# Patient Record
Sex: MALE | Race: WHITE | Employment: OTHER | URBAN - METROPOLITAN AREA
[De-identification: names, ages, dates, MRNs, and addresses within clinical notes are randomized per-mention and may not be internally consistent; named-entity substitution may affect disease eponyms.]

---

## 2017-05-10 ENCOUNTER — OFFICE VISIT (OUTPATIENT)
Dept: SURGERY | Facility: CLINIC | Age: 77
End: 2017-05-10

## 2017-05-10 VITALS
RESPIRATION RATE: 16 BRPM | BODY MASS INDEX: 34.02 KG/M2 | SYSTOLIC BLOOD PRESSURE: 118 MMHG | TEMPERATURE: 98 F | HEIGHT: 69.5 IN | HEART RATE: 61 BPM | DIASTOLIC BLOOD PRESSURE: 80 MMHG | WEIGHT: 235 LBS

## 2017-05-10 DIAGNOSIS — C61 PROSTATE CANCER (HCC): Primary | ICD-10-CM

## 2017-05-10 DIAGNOSIS — R36.1 HEMOSPERMIA: ICD-10-CM

## 2017-05-10 DIAGNOSIS — N52.01 ERECTILE DYSFUNCTION DUE TO ARTERIAL INSUFFICIENCY: ICD-10-CM

## 2017-05-10 DIAGNOSIS — R35.1 NOCTURIA: ICD-10-CM

## 2017-05-10 DIAGNOSIS — R31.0 GROSS HEMATURIA: ICD-10-CM

## 2017-05-10 DIAGNOSIS — K40.20 NON-RECURRENT BILATERAL INGUINAL HERNIA WITHOUT OBSTRUCTION OR GANGRENE: ICD-10-CM

## 2017-05-10 PROCEDURE — 99214 OFFICE O/P EST MOD 30 MIN: CPT | Performed by: UROLOGY

## 2017-05-10 PROCEDURE — G0463 HOSPITAL OUTPT CLINIC VISIT: HCPCS | Performed by: UROLOGY

## 2017-05-10 NOTE — PATIENT INSTRUCTIONS
1.  Please consider seeking opinion from general surgeon concerning your inguinal/groin hernias    2. Please get blood draw for PSA and urine for cytology before visit.

## 2017-05-10 NOTE — PROGRESS NOTES
HPI:    Patient ID: Stan Angulo is a 78year old male. HPI     1. Nocturia  Patient's AUA score today was 6, mild voiding dysfunction category better than AUA 10 on chart review (10/19/2016).   The patient has urinary frequency less than every 2 hour present when we first met him in 1999; initially responded to Viagra, but by the time we did the seed implant he had already stopped responding to Viagra before the seed implant.  Maximum PSA after seed implant was 2.6, occurring 6 months after implant; rem fever and unexpected weight change. Respiratory: Negative for shortness of breath. Cardiovascular: Negative for chest pain. Gastrointestinal: Negative for constipation. Neurological: Negative for speech difficulty.         HISTORY:  Past Medical Hi mouth daily. Disp:  Rfl:    allopurinol 300 MG Oral Tab TAKE 1 TABLET ONCE DAILY Disp: 90 tablet Rfl: 3   Lisinopril-Hydrochlorothiazide 20-12.5 MG Oral Tab Take 1 tablet by mouth once daily.  Disp: 90 tablet Rfl: 2   AmLODIPine Besylate 5 MG Oral Tab Take m)   Weight: 235 lb (106.595 kg)     Body mass index is 34.22 kg/(m^2).      LABORATORIES    4/20/2017 PSA = 0.1  3/28/2017 creatinine = 0.76, GFR CKD-EPI = 89.88  10/10/2016 urine cytology = no malignant cells identified  10/10/2016 UA RBC = none seen    I not interested. He chooses observation. 6. (K40.20) Non-recurrent bilateral inguinal hernia without obstruction or gangrene  Plan:  The fact that the right inguinal hernia has a segment of bladder poses on immediate risk but the fact that the patient i

## 2017-10-12 PROCEDURE — 81003 URINALYSIS AUTO W/O SCOPE: CPT | Performed by: INTERNAL MEDICINE

## 2017-10-12 PROCEDURE — 82570 ASSAY OF URINE CREATININE: CPT | Performed by: INTERNAL MEDICINE

## 2017-10-12 PROCEDURE — 82043 UR ALBUMIN QUANTITATIVE: CPT | Performed by: INTERNAL MEDICINE

## 2018-04-11 PROBLEM — Z85.46 HISTORY OF PROSTATE CANCER: Status: ACTIVE | Noted: 2018-04-11

## 2018-05-16 ENCOUNTER — OFFICE VISIT (OUTPATIENT)
Dept: SURGERY | Facility: CLINIC | Age: 78
End: 2018-05-16

## 2018-05-16 VITALS
TEMPERATURE: 99 F | BODY MASS INDEX: 36.14 KG/M2 | HEART RATE: 65 BPM | DIASTOLIC BLOOD PRESSURE: 82 MMHG | WEIGHT: 244 LBS | HEIGHT: 69 IN | SYSTOLIC BLOOD PRESSURE: 119 MMHG

## 2018-05-16 DIAGNOSIS — R35.1 NOCTURIA: ICD-10-CM

## 2018-05-16 DIAGNOSIS — K40.20 NON-RECURRENT BILATERAL INGUINAL HERNIA WITHOUT OBSTRUCTION OR GANGRENE: ICD-10-CM

## 2018-05-16 DIAGNOSIS — Z87.448 HISTORY OF GROSS HEMATURIA: ICD-10-CM

## 2018-05-16 DIAGNOSIS — N52.01 ERECTILE DYSFUNCTION DUE TO ARTERIAL INSUFFICIENCY: ICD-10-CM

## 2018-05-16 DIAGNOSIS — C61 PROSTATE CANCER (HCC): Primary | ICD-10-CM

## 2018-05-16 DIAGNOSIS — R36.1 HEMOSPERMIA: ICD-10-CM

## 2018-05-16 PROCEDURE — G0463 HOSPITAL OUTPT CLINIC VISIT: HCPCS | Performed by: UROLOGY

## 2018-05-16 PROCEDURE — 99214 OFFICE O/P EST MOD 30 MIN: CPT | Performed by: UROLOGY

## 2018-05-16 NOTE — PROGRESS NOTES
HPI:    Patient ID: Dionicio Nagy is a 70year old male. HPI     1.  Voiding Dysfunction  Patient has current AUA score of 10, moderate voiding dysfunction category, mildly worse compared to previous score of 6, mild voiding dysfunction category, on 05 in October 2016. The patient feels this condition is stable. 5. History of hemospermia   There has been no recurrence. The patient feels this condition is stable.      6. Bilateral inguinal hernias  Patient has a large right inguinal hernia and a small at [address] [place] [address] on 10/25/2016; findings were prostatic urethra no significant lateral lobe enlargement; mild high riding median bar at the bladder neck with mild upward curvature; bladder neck appears more musculature and aperture is somewhat smaller; visualization AmLODIPine Besylate 5 MG Oral Tab Take 1 tablet (5 mg total) by mouth daily. Disp: 90 tablet Rfl: 3   Lisinopril-Hydrochlorothiazide 20-12.5 MG Oral Tab Take 1 tablet by mouth once daily.  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               ASSESSMENT/PLAN: alternatives to the above treatment options listed above, and I answered questions concerning them; patient understands all of this and decides to proceed with the following:       Treatment Plan & Patient Instructions    Visit in 1 year.    PSA and urinaly

## 2018-08-17 PROCEDURE — 88305 TISSUE EXAM BY PATHOLOGIST: CPT | Performed by: INTERNAL MEDICINE

## 2018-09-28 PROCEDURE — 81003 URINALYSIS AUTO W/O SCOPE: CPT | Performed by: INTERNAL MEDICINE

## 2018-10-04 PROBLEM — K57.30 DIVERTICULOSIS OF LARGE INTESTINE: Status: ACTIVE | Noted: 2018-10-04

## 2018-10-04 PROBLEM — D12.6 ADENOMA OF COLON: Status: ACTIVE | Noted: 2018-10-04

## 2018-12-18 ENCOUNTER — MYAURORA ACCOUNT LINK (OUTPATIENT)
Dept: OTHER | Age: 78
End: 2018-12-18

## 2019-01-21 PROCEDURE — 86635 COCCIDIOIDES ANTIBODY: CPT | Performed by: INTERNAL MEDICINE

## 2019-01-21 PROCEDURE — 86698 HISTOPLASMA ANTIBODY: CPT | Performed by: INTERNAL MEDICINE

## 2019-01-21 PROCEDURE — 86641 CRYPTOCOCCUS ANTIBODY: CPT | Performed by: INTERNAL MEDICINE

## 2019-01-21 PROCEDURE — 36415 COLL VENOUS BLD VENIPUNCTURE: CPT | Performed by: INTERNAL MEDICINE

## 2019-01-21 PROCEDURE — 86612 BLASTOMYCES ANTIBODY: CPT | Performed by: INTERNAL MEDICINE

## 2019-01-21 PROCEDURE — 86606 ASPERGILLUS ANTIBODY: CPT | Performed by: INTERNAL MEDICINE

## 2019-01-28 RX ORDER — FLUMAZENIL 0.1 MG/ML
0.2 INJECTION, SOLUTION INTRAVENOUS ONCE
Status: DISCONTINUED | OUTPATIENT
Start: 2019-01-29 | End: 2019-01-31

## 2019-01-28 RX ORDER — LIDOCAINE HYDROCHLORIDE 10 MG/ML
10 INJECTION, SOLUTION EPIDURAL; INFILTRATION; INTRACAUDAL; PERINEURAL ONCE
Status: DISCONTINUED | OUTPATIENT
Start: 2019-01-29 | End: 2019-01-31

## 2019-01-28 RX ORDER — SODIUM CHLORIDE 9 MG/ML
INJECTION, SOLUTION INTRAVENOUS CONTINUOUS
Status: DISCONTINUED | OUTPATIENT
Start: 2019-01-28 | End: 2019-01-31

## 2019-01-28 RX ORDER — NALOXONE HYDROCHLORIDE 0.4 MG/ML
80 INJECTION, SOLUTION INTRAMUSCULAR; INTRAVENOUS; SUBCUTANEOUS ONCE
Status: DISCONTINUED | OUTPATIENT
Start: 2019-01-29 | End: 2019-01-31

## 2019-01-28 RX ORDER — MIDAZOLAM HYDROCHLORIDE 1 MG/ML
1 INJECTION INTRAMUSCULAR; INTRAVENOUS ONCE
Status: COMPLETED | OUTPATIENT
Start: 2019-01-29 | End: 2019-01-29

## 2019-01-29 ENCOUNTER — HOSPITAL ENCOUNTER (OUTPATIENT)
Dept: GENERAL RADIOLOGY | Facility: HOSPITAL | Age: 79
Discharge: HOME | End: 2019-01-29
Attending: RADIOLOGY
Payer: MEDICARE

## 2019-01-29 ENCOUNTER — HOSPITAL ENCOUNTER (OUTPATIENT)
Dept: CT IMAGING | Facility: HOSPITAL | Age: 79
Discharge: HOME | End: 2019-01-29
Attending: INTERNAL MEDICINE
Payer: MEDICARE

## 2019-01-29 VITALS
HEIGHT: 69.5 IN | HEART RATE: 75 BPM | DIASTOLIC BLOOD PRESSURE: 76 MMHG | BODY MASS INDEX: 34.02 KG/M2 | WEIGHT: 235 LBS | SYSTOLIC BLOOD PRESSURE: 139 MMHG | RESPIRATION RATE: 16 BRPM | OXYGEN SATURATION: 95 %

## 2019-01-29 DIAGNOSIS — C34.32 MALIGNANT NEOPLASM OF LOWER LOBE OF LEFT LUNG (HCC): ICD-10-CM

## 2019-01-29 DIAGNOSIS — R93.89 ABNORMAL CT OF THE CHEST: ICD-10-CM

## 2019-01-29 DIAGNOSIS — C34.92 SQUAMOUS CELL CARCINOMA OF LUNG, LEFT (HCC): Primary | ICD-10-CM

## 2019-01-29 LAB
DEPRECATED RDW RBC AUTO: 40.4 FL (ref 35.1–46.3)
ERYTHROCYTE [DISTWIDTH] IN BLOOD BY AUTOMATED COUNT: 12.4 % (ref 11–15)
HCT VFR BLD AUTO: 41.7 % (ref 39–53)
HGB BLD-MCNC: 14.1 G/DL (ref 13–17.5)
MCH RBC QN AUTO: 30.4 PG (ref 26–34)
MCHC RBC AUTO-ENTMCNC: 33.8 G/DL (ref 31–37)
MCV RBC AUTO: 89.9 FL (ref 80–100)
PLATELET # BLD AUTO: 430 10(3)UL (ref 150–450)
RBC # BLD AUTO: 4.64 X10(6)UL (ref 3.8–5.8)
WBC # BLD AUTO: 10.4 X10(3) UL (ref 4–11)

## 2019-01-29 PROCEDURE — 77012 CT SCAN FOR NEEDLE BIOPSY: CPT | Performed by: INTERNAL MEDICINE

## 2019-01-29 PROCEDURE — 88305 TISSUE EXAM BY PATHOLOGIST: CPT | Performed by: INTERNAL MEDICINE

## 2019-01-29 PROCEDURE — 88341 IMHCHEM/IMCYTCHM EA ADD ANTB: CPT | Performed by: INTERNAL MEDICINE

## 2019-01-29 PROCEDURE — 88333 PATH CONSLTJ SURG CYTO XM 1: CPT | Performed by: INTERNAL MEDICINE

## 2019-01-29 PROCEDURE — 81210 BRAF GENE: CPT

## 2019-01-29 PROCEDURE — 88342 IMHCHEM/IMCYTCHM 1ST ANTB: CPT | Performed by: INTERNAL MEDICINE

## 2019-01-29 PROCEDURE — 88374 M/PHMTRC ALYS ISHQUANT/SEMIQ: CPT | Performed by: RADIOLOGY

## 2019-01-29 PROCEDURE — 32405 CT BIOPSY LUNG OR MEDIASTINUM (CPT=77012/32405): CPT | Performed by: INTERNAL MEDICINE

## 2019-01-29 PROCEDURE — 88173 CYTOPATH EVAL FNA REPORT: CPT | Performed by: INTERNAL MEDICINE

## 2019-01-29 PROCEDURE — 85027 COMPLETE CBC AUTOMATED: CPT | Performed by: RADIOLOGY

## 2019-01-29 PROCEDURE — 71045 X-RAY EXAM CHEST 1 VIEW: CPT | Performed by: RADIOLOGY

## 2019-01-29 PROCEDURE — 99152 MOD SED SAME PHYS/QHP 5/>YRS: CPT | Performed by: INTERNAL MEDICINE

## 2019-01-29 PROCEDURE — 81235 EGFR GENE COM VARIANTS: CPT

## 2019-01-29 PROCEDURE — 88363 XM ARCHIVE TISSUE MOLEC ANAL: CPT | Performed by: INTERNAL MEDICINE

## 2019-01-29 PROCEDURE — 88360 TUMOR IMMUNOHISTOCHEM/MANUAL: CPT | Performed by: RADIOLOGY

## 2019-01-29 PROCEDURE — 88334 PATH CONSLTJ SURG CYTO XM EA: CPT | Performed by: INTERNAL MEDICINE

## 2019-01-29 PROCEDURE — 36415 COLL VENOUS BLD VENIPUNCTURE: CPT | Performed by: RADIOLOGY

## 2019-01-29 RX ORDER — SODIUM CHLORIDE 9 MG/ML
INJECTION, SOLUTION INTRAVENOUS
Status: DISPENSED
Start: 2019-01-29 | End: 2019-01-30

## 2019-01-29 RX ORDER — LIDOCAINE HYDROCHLORIDE 10 MG/ML
INJECTION, SOLUTION EPIDURAL; INFILTRATION; INTRACAUDAL; PERINEURAL
Status: DISPENSED
Start: 2019-01-29 | End: 2019-01-30

## 2019-01-29 RX ORDER — MIDAZOLAM HYDROCHLORIDE 1 MG/ML
INJECTION INTRAMUSCULAR; INTRAVENOUS
Status: COMPLETED
Start: 2019-01-29 | End: 2019-01-29

## 2019-01-29 RX ADMIN — MIDAZOLAM HYDROCHLORIDE 0.5 MG: 1 INJECTION INTRAMUSCULAR; INTRAVENOUS at 13:29:00

## 2019-01-29 RX ADMIN — MIDAZOLAM HYDROCHLORIDE 0.5 MG: 1 INJECTION INTRAMUSCULAR; INTRAVENOUS at 13:32:00

## 2019-01-29 RX ADMIN — MIDAZOLAM HYDROCHLORIDE 1 MG: 1 INJECTION INTRAMUSCULAR; INTRAVENOUS at 13:24:00

## 2019-01-29 NOTE — PROGRESS NOTES
Pt is able to sit up and ambulate without difficulty. Procedural site remains dry and intact with good circulation, motion, and sensation. No signs and symptoms of bleeding/hematoma noted.    Instruction provided, patient/family verbalizes understandin

## 2019-01-29 NOTE — PROGRESS NOTES
1315 PT TO CT PRONE ON TABLE. MONITORS APPLIED  1323 TIME OUT  1324 VERSED 1MG AND  FENTANYL  100MCG IVP PER DR GARSIA VERBAL ORDER  1325 LIDO TO LEFT UPPER BACK AREA  1327 BARD MISSION 20G X 10CM  BIOPSY NEEDLE INSERTED  1329 VERSED . 5MG AND FENTANYL 50MCG

## 2019-02-11 ENCOUNTER — PRIOR ORIGINAL RECORDS (OUTPATIENT)
Dept: OTHER | Age: 79
End: 2019-02-11

## 2019-02-11 PROBLEM — C34.32 MALIGNANT NEOPLASM OF LOWER LOBE OF LEFT LUNG (HCC): Status: ACTIVE | Noted: 2019-02-11

## 2019-02-11 RX ORDER — LISINOPRIL AND HYDROCHLOROTHIAZIDE 25; 20 MG/1; MG/1
1 TABLET ORAL DAILY
COMMUNITY
End: 2019-08-28

## 2019-02-11 RX ORDER — VIT A/VIT C/VIT E/ZINC/COPPER 2148-113
TABLET ORAL 2 TIMES DAILY
COMMUNITY

## 2019-02-11 ASSESSMENT — PAIN SCALES - GENERAL: PAINLEVEL_OUTOF10: 0

## 2019-02-12 ENCOUNTER — HOSPITAL ENCOUNTER (OUTPATIENT)
Dept: INTERVENTIONAL RADIOLOGY/VASCULAR | Facility: HOSPITAL | Age: 79
Discharge: HOME | End: 2019-02-12
Attending: INTERNAL MEDICINE | Admitting: INTERNAL MEDICINE
Payer: MEDICARE

## 2019-02-12 VITALS
SYSTOLIC BLOOD PRESSURE: 146 MMHG | DIASTOLIC BLOOD PRESSURE: 95 MMHG | BODY MASS INDEX: 35 KG/M2 | WEIGHT: 240 LBS | HEART RATE: 107 BPM | RESPIRATION RATE: 19 BRPM | OXYGEN SATURATION: 96 %

## 2019-02-12 DIAGNOSIS — C34.90 MALIGNANT NEOPLASM OF LUNG, UNSPECIFIED LATERALITY, UNSPECIFIED PART OF LUNG (HCC): ICD-10-CM

## 2019-02-12 PROCEDURE — 88333 PATH CONSLTJ SURG CYTO XM 1: CPT | Performed by: INTERNAL MEDICINE

## 2019-02-12 PROCEDURE — 88334 PATH CONSLTJ SURG CYTO XM EA: CPT | Performed by: INTERNAL MEDICINE

## 2019-02-12 PROCEDURE — 88305 TISSUE EXAM BY PATHOLOGIST: CPT | Performed by: INTERNAL MEDICINE

## 2019-02-12 PROCEDURE — 88363 XM ARCHIVE TISSUE MOLEC ANAL: CPT | Performed by: INTERNAL MEDICINE

## 2019-02-12 PROCEDURE — 0KBN3ZX EXCISION OF RIGHT HIP MUSCLE, PERCUTANEOUS APPROACH, DIAGNOSTIC: ICD-10-PCS | Performed by: RADIOLOGY

## 2019-02-12 PROCEDURE — 36415 COLL VENOUS BLD VENIPUNCTURE: CPT

## 2019-02-12 PROCEDURE — 76942 ECHO GUIDE FOR BIOPSY: CPT

## 2019-02-12 PROCEDURE — 88342 IMHCHEM/IMCYTCHM 1ST ANTB: CPT | Performed by: INTERNAL MEDICINE

## 2019-02-12 PROCEDURE — 20206 BIOPSY MUSCLE PERQ NEEDLE: CPT

## 2019-02-12 PROCEDURE — 88341 IMHCHEM/IMCYTCHM EA ADD ANTB: CPT | Performed by: INTERNAL MEDICINE

## 2019-02-12 RX ORDER — MIDAZOLAM HYDROCHLORIDE 1 MG/ML
INJECTION INTRAMUSCULAR; INTRAVENOUS
Status: DISCONTINUED
Start: 2019-02-12 | End: 2019-02-12 | Stop reason: WASHOUT

## 2019-02-12 RX ORDER — SODIUM CHLORIDE 9 MG/ML
INJECTION, SOLUTION INTRAVENOUS CONTINUOUS
Status: DISCONTINUED | OUTPATIENT
Start: 2019-02-12 | End: 2019-02-12

## 2019-02-12 RX ORDER — LIDOCAINE HYDROCHLORIDE 20 MG/ML
INJECTION, SOLUTION EPIDURAL; INFILTRATION; INTRACAUDAL; PERINEURAL
Status: COMPLETED
Start: 2019-02-12 | End: 2019-02-12

## 2019-02-12 RX ORDER — SODIUM CHLORIDE 9 MG/ML
INJECTION, SOLUTION INTRAVENOUS
Status: DISCONTINUED
Start: 2019-02-12 | End: 2019-02-12

## 2019-02-12 RX ORDER — MIDAZOLAM HYDROCHLORIDE 1 MG/ML
INJECTION INTRAMUSCULAR; INTRAVENOUS
Status: COMPLETED
Start: 2019-02-12 | End: 2019-02-12

## 2019-02-12 NOTE — INTERVAL H&P NOTE
The above referenced H&P was reviewed by Tatyana Wu MD on 2/12/2019, the patient was examined and no significant changes have occurred in the patient's condition since the H&P was performed.   Risks, benefits, alternative treatments and consequences of no

## 2019-02-15 ENCOUNTER — HOSPITAL (OUTPATIENT)
Dept: OTHER | Age: 79
End: 2019-02-15

## 2019-02-15 ENCOUNTER — DIAGNOSTIC TRANS (OUTPATIENT)
Dept: OTHER | Age: 79
End: 2019-02-15

## 2019-02-15 ENCOUNTER — HOSPITAL (OUTPATIENT)
Dept: OTHER | Age: 79
End: 2019-02-15
Attending: HOSPITALIST

## 2019-02-15 LAB
ALBUMIN SERPL-MCNC: 2.8 GM/DL (ref 3.6–5.1)
ALBUMIN/GLOB SERPL: 0.6 {RATIO} (ref 1–2.4)
ALP SERPL-CCNC: 98 UNIT/L (ref 45–117)
ALT SERPL-CCNC: 23 UNIT/L
ANALYZER ANC (IANC): ABNORMAL
ANION GAP SERPL CALC-SCNC: 10 MMOL/L (ref 10–20)
APTT PPP: 29 SECONDS (ref 22–32)
APTT PPP: NORMAL S
AST SERPL-CCNC: 30 UNIT/L
BASOPHILS # BLD: 0 THOUSAND/MCL (ref 0–0.3)
BASOPHILS NFR BLD: 0 %
BILIRUB SERPL-MCNC: 0.5 MG/DL (ref 0.2–1)
BUN SERPL-MCNC: 8 MG/DL (ref 6–20)
BUN/CREAT SERPL: 16 (ref 7–25)
CALCIUM SERPL-MCNC: 8.9 MG/DL (ref 8.4–10.2)
CHLORIDE: 96 MMOL/L (ref 98–107)
CO2 SERPL-SCNC: 29 MMOL/L (ref 21–32)
CREAT SERPL-MCNC: 0.5 MG/DL (ref 0.67–1.17)
DIFFERENTIAL METHOD BLD: ABNORMAL
EOSINOPHIL # BLD: 0 THOUSAND/MCL (ref 0.1–0.5)
EOSINOPHIL NFR BLD: 0 %
ERYTHROCYTE [DISTWIDTH] IN BLOOD: 12.2 % (ref 11–15)
GLOBULIN SER-MCNC: 4.5 GM/DL (ref 2–4)
GLUCOSE SERPL-MCNC: 108 MG/DL (ref 65–99)
HEMATOCRIT: 37.1 % (ref 39–51)
HGB BLD-MCNC: 12.2 GM/DL (ref 13–17)
IMM GRANULOCYTES # BLD AUTO: 0.1 THOUSAND/MCL (ref 0–0.2)
IMM GRANULOCYTES NFR BLD: 1 %
INR PPP: 1.1
LYMPHOCYTES # BLD: 1.1 THOUSAND/MCL (ref 1–4)
LYMPHOCYTES NFR BLD: 10 %
MAGNESIUM SERPL-MCNC: 1.7 MG/DL (ref 1.7–2.4)
MCH RBC QN AUTO: 29.5 PG (ref 26–34)
MCHC RBC AUTO-ENTMCNC: 32.9 GM/DL (ref 32–36.5)
MCV RBC AUTO: 89.8 FL (ref 78–100)
MONOCYTES # BLD: 0.6 THOUSAND/MCL (ref 0.3–0.9)
MONOCYTES NFR BLD: 6 %
NEUTROPHILS # BLD: 9.2 THOUSAND/MCL (ref 1.8–7.7)
NEUTROPHILS NFR BLD: 83 %
NEUTS SEG NFR BLD: ABNORMAL %
NRBC (NRBCRE): 0 /100 WBC
PLATELET # BLD: 418 THOUSAND/MCL (ref 140–450)
POTASSIUM SERPL-SCNC: 4.6 MMOL/L (ref 3.4–5.1)
PROT SERPL-MCNC: 7.3 GM/DL (ref 6.4–8.2)
PROTHROMBIN TIME: 11.6 SECONDS (ref 9.7–11.8)
PROTHROMBIN TIME: NORMAL
RBC # BLD: 4.13 MILLION/MCL (ref 4.5–5.9)
SODIUM SERPL-SCNC: 130 MMOL/L (ref 135–145)
TROPONIN I SERPL HS-MCNC: <0.02 NG/ML
TROPONIN I SERPL HS-MCNC: <0.02 NG/ML
WBC # BLD: 11.1 THOUSAND/MCL (ref 4.2–11)

## 2019-02-16 LAB
ALBUMIN SERPL-MCNC: 2.7 GM/DL (ref 3.6–5.1)
ALBUMIN/GLOB SERPL: 0.6 {RATIO} (ref 1–2.4)
ALP SERPL-CCNC: 99 UNIT/L (ref 45–117)
ALT SERPL-CCNC: 25 UNIT/L
ANION GAP SERPL CALC-SCNC: 12 MMOL/L (ref 10–20)
AST SERPL-CCNC: 19 UNIT/L
BILIRUB SERPL-MCNC: 0.4 MG/DL (ref 0.2–1)
BUN SERPL-MCNC: 8 MG/DL (ref 6–20)
BUN/CREAT SERPL: 14 (ref 7–25)
CALCIUM SERPL-MCNC: 9.2 MG/DL (ref 8.4–10.2)
CHLORIDE: 96 MMOL/L (ref 98–107)
CO2 SERPL-SCNC: 31 MMOL/L (ref 21–32)
CREAT SERPL-MCNC: 0.57 MG/DL (ref 0.67–1.17)
GLOBULIN SER-MCNC: 4.8 GM/DL (ref 2–4)
GLUCOSE SERPL-MCNC: 124 MG/DL (ref 65–99)
MAGNESIUM SERPL-MCNC: 1.8 MG/DL (ref 1.7–2.4)
POTASSIUM SERPL-SCNC: 4.4 MMOL/L (ref 3.4–5.1)
PROT SERPL-MCNC: 7.5 GM/DL (ref 6.4–8.2)
SODIUM SERPL-SCNC: 135 MMOL/L (ref 135–145)
TROPONIN I SERPL HS-MCNC: <0.02 NG/ML
TROPONIN I SERPL HS-MCNC: <0.02 NG/ML

## 2019-02-25 ENCOUNTER — LAB REQUISITION (OUTPATIENT)
Dept: LAB | Facility: HOSPITAL | Age: 79
End: 2019-02-25
Payer: MEDICARE

## 2019-02-25 DIAGNOSIS — C34.32 MALIGNANT NEOPLASM OF LOWER LOBE, LEFT BRONCHUS OR LUNG (HCC): ICD-10-CM

## 2019-02-25 PROCEDURE — 88363 XM ARCHIVE TISSUE MOLEC ANAL: CPT | Performed by: PATHOLOGY

## 2019-02-27 PROCEDURE — 87086 URINE CULTURE/COLONY COUNT: CPT | Performed by: UROLOGY

## 2019-02-27 PROCEDURE — 88108 CYTOPATH CONCENTRATE TECH: CPT | Performed by: UROLOGY

## 2019-03-20 ENCOUNTER — LAB REQUISITION (OUTPATIENT)
Dept: LAB | Facility: HOSPITAL | Age: 79
End: 2019-03-20
Payer: MEDICARE

## 2019-03-20 DIAGNOSIS — C34.32 MALIGNANT NEOPLASM OF LOWER LOBE, LEFT BRONCHUS OR LUNG (HCC): ICD-10-CM

## 2019-03-20 PROCEDURE — 88363 XM ARCHIVE TISSUE MOLEC ANAL: CPT | Performed by: PATHOLOGY

## 2019-04-19 PROBLEM — Q80.9 XERODERMA: Status: ACTIVE | Noted: 2019-04-19

## 2019-04-19 PROBLEM — R26.2 DIFFICULTY WALKING: Status: ACTIVE | Noted: 2019-04-19

## 2019-04-19 PROBLEM — Q82.8 POROKERATOSIS: Status: ACTIVE | Noted: 2019-04-19

## 2019-05-22 ENCOUNTER — TELEPHONE (OUTPATIENT)
Dept: SURGERY | Facility: CLINIC | Age: 79
End: 2019-05-22

## 2019-05-22 ENCOUNTER — OFFICE VISIT (OUTPATIENT)
Dept: SURGERY | Facility: CLINIC | Age: 79
End: 2019-05-22
Payer: MEDICARE

## 2019-05-22 VITALS
WEIGHT: 236 LBS | BODY MASS INDEX: 34.96 KG/M2 | HEART RATE: 61 BPM | DIASTOLIC BLOOD PRESSURE: 82 MMHG | HEIGHT: 69 IN | TEMPERATURE: 98 F | SYSTOLIC BLOOD PRESSURE: 129 MMHG

## 2019-05-22 DIAGNOSIS — R35.1 NOCTURIA: ICD-10-CM

## 2019-05-22 DIAGNOSIS — N52.01 ERECTILE DYSFUNCTION DUE TO ARTERIAL INSUFFICIENCY: ICD-10-CM

## 2019-05-22 DIAGNOSIS — K40.20 NON-RECURRENT BILATERAL INGUINAL HERNIA WITHOUT OBSTRUCTION OR GANGRENE: ICD-10-CM

## 2019-05-22 DIAGNOSIS — C34.92 SQUAMOUS CELL LUNG CANCER, LEFT (HCC): ICD-10-CM

## 2019-05-22 DIAGNOSIS — R36.1 HEMOSPERMIA: ICD-10-CM

## 2019-05-22 DIAGNOSIS — C61 PROSTATE CANCER (HCC): Primary | ICD-10-CM

## 2019-05-22 DIAGNOSIS — Z87.448 HISTORY OF GROSS HEMATURIA: ICD-10-CM

## 2019-05-22 DIAGNOSIS — N30.40 RADIATION CYSTITIS: ICD-10-CM

## 2019-05-22 PROCEDURE — 99214 OFFICE O/P EST MOD 30 MIN: CPT | Performed by: UROLOGY

## 2019-05-22 PROCEDURE — G0463 HOSPITAL OUTPT CLINIC VISIT: HCPCS | Performed by: UROLOGY

## 2019-05-22 NOTE — PATIENT INSTRUCTIONS
Venus Landa M.D. Visit in 1 year. Blood draw for PSA and urine test for cytology 2--10 days before visit.

## 2019-05-22 NOTE — PROGRESS NOTES
HPI:    Patient ID: Yaritza Anderson is a 70year old male. HPI   Prostate cancer  Chronic.  Status post brachytherapy 2002. Patient denies associated symptoms to suggest prostate cancer such as weight loss or loss of appetite or bone pain and denies asso this condition was improved when he decreased his dose of daily Aspirin; he now takes the children's Aspirin daily; denies any history of heart attack, stroke or CAD. Negative urology workup in October 2016. The patient feels this condition is stable. 10/21/2016 - no obvious tumors or stones; urine cytologies have been negative; urine cultures negative; because of recurrent gross hematuria patient underwent cystoscopy with bilateral retrograde pyelography at [facility] on 10/25/2016; findings were prostatic ur IV, large 9 cm left lower lobe mass, also noted to have abdominal lymph nodes and 3.3. Cm right gluteus musculature mass      Review of Systems   Constitutional: Negative for fever. HENT: Negative for voice change.     Respiratory: Negative for chest tigh Allergies    HISTORY:  Past Medical History:   Diagnosis Date   • Cancer [facility])     prostate   • Diverticulosis of large intestine    • Exposure to medical diagnostic radiation     see implants to prostate   • Gout    • High blood pressure    • High cholest use: No       Over the past month, how often have you had a sensation of not emptying your bladder completely after you finish urinating?: Not at all  Over the past month, how often have you had to urinate again less than 2 hours after you finished urinati none   2/27/2019 urine cytology = no cytologic evidence of malignancy  05/08/2018 PSA = <0.1;  Occult Blood = Negative; Urine Culture = No Growth;   10/12/2017 Creatinine = 0.97; eGFR = 76.05  4/20/2017 PSA = 0.1  3/28/2017 creatinine = 0.76, GFR CKD-EPI = well to [facility].  Patient is followed by Dr. Desiree Garcia for this.    (L85.086) History of gross hematuria  Patient had a negative workup in October 2016 and recently in March 2019 by another urologist Dr. Mae Perez when patient was put on Xarelto for on visit.       Orders Placed This Encounter      PSA - DIAGNOSTIC      Cytology, fluids -- future      Meds This Visit:  Requested Prescriptions      No prescriptions requested or ordered in this encounter       Imaging & Referrals:  None     ID#[id number]    By si

## 2019-05-22 NOTE — TELEPHONE ENCOUNTER
Patient was seen by [facility] today and given cytology lab order to be completed prior to visit in 1 year. Patient's insurance requires his labs to be completed at a [facility] facility.  Patient was given [facility] requisition form for urine cyto

## 2019-09-03 PROBLEM — E78.00 HYPERCHOLESTEROLEMIA: Status: ACTIVE | Noted: 2019-09-03

## 2019-09-03 PROBLEM — R26.2 DIFFICULTY WALKING: Status: RESOLVED | Noted: 2019-04-19 | Resolved: 2019-09-03

## 2020-06-01 ENCOUNTER — TELEPHONE (OUTPATIENT)
Dept: SURGERY | Facility: CLINIC | Age: 80
End: 2020-06-01

## 2020-06-02 ENCOUNTER — TELEPHONE (OUTPATIENT)
Dept: SURGERY | Facility: CLINIC | Age: 80
End: 2020-06-02

## 2020-06-08 PROBLEM — C79.9 SECONDARY MALIGNANT NEOPLASM OF UNSPECIFIED SITE (CODE) (HCC): Status: ACTIVE | Noted: 2020-06-08

## 2020-07-12 ENCOUNTER — TELEPHONE (OUTPATIENT)
Dept: SURGERY | Facility: CLINIC | Age: 80
End: 2020-07-12

## 2020-07-13 NOTE — TELEPHONE ENCOUNTER
I sent patient the following message by means of \"my chart\" =  \"Raj,  6/1/2020 [facility] urine cytology showed NO cancer cells in urine. Please continue urology plans as last discussed.   I wish you the best of health, Dr. Tuan Richard M.D\"

## 2020-10-08 VITALS
RESPIRATION RATE: 14 BRPM | DIASTOLIC BLOOD PRESSURE: 71 MMHG | BODY MASS INDEX: 35.66 KG/M2 | HEART RATE: 80 BPM | HEIGHT: 69 IN | SYSTOLIC BLOOD PRESSURE: 136 MMHG | TEMPERATURE: 97.6 F | OXYGEN SATURATION: 96 % | WEIGHT: 240.74 LBS

## 2021-02-09 DIAGNOSIS — Z23 NEED FOR VACCINATION: ICD-10-CM

## 2021-03-30 ENCOUNTER — TELEPHONE (OUTPATIENT)
Dept: SURGERY | Facility: CLINIC | Age: 81
End: 2021-03-30

## 2021-03-30 NOTE — TELEPHONE ENCOUNTER
Spoke with patient. Printed orders at  for . PT confirmed and verbalized understanding.      Future Appointments   Date Time Provider Karen Nguyen   4/5/2021  9:00 AM Dania Dugan MD [facility]   4/27/2021  9:00 AM Mack Owusu

## 2021-03-30 NOTE — TELEPHONE ENCOUNTER
Pt needs order for blood work for [employer] said will come  when ready asking for a call when ready please advise

## 2021-04-05 PROBLEM — C61 CARCINOMA OF PROSTATE (HCC): Status: ACTIVE | Noted: 2021-04-05

## 2021-04-05 PROBLEM — C61 CARCINOMA OF PROSTATE (HCC): Status: RESOLVED | Noted: 2021-04-05 | Resolved: 2021-04-05

## 2021-04-05 PROBLEM — C34.90 PRIMARY MALIGNANT NEOPLASM OF LUNG (HCC): Status: ACTIVE | Noted: 2021-04-05

## 2021-05-24 ENCOUNTER — LAB ENCOUNTER (OUTPATIENT)
Dept: LAB | Facility: HOSPITAL | Age: 81
End: 2021-05-24
Attending: UROLOGY
Payer: MEDICARE

## 2021-05-24 DIAGNOSIS — R35.1 NOCTURIA: ICD-10-CM

## 2021-05-24 DIAGNOSIS — Z87.448 HISTORY OF GROSS HEMATURIA: ICD-10-CM

## 2021-05-24 PROCEDURE — 88108 CYTOPATH CONCENTRATE TECH: CPT

## 2021-05-28 ENCOUNTER — OFFICE VISIT (OUTPATIENT)
Dept: SURGERY | Facility: CLINIC | Age: 81
End: 2021-05-28
Payer: MEDICARE

## 2021-05-28 VITALS
WEIGHT: 250 LBS | BODY MASS INDEX: 37.03 KG/M2 | SYSTOLIC BLOOD PRESSURE: 115 MMHG | HEART RATE: 66 BPM | HEIGHT: 69 IN | DIASTOLIC BLOOD PRESSURE: 74 MMHG

## 2021-05-28 DIAGNOSIS — R35.1 NOCTURIA: ICD-10-CM

## 2021-05-28 DIAGNOSIS — K40.20 NON-RECURRENT BILATERAL INGUINAL HERNIA WITHOUT OBSTRUCTION OR GANGRENE: ICD-10-CM

## 2021-05-28 DIAGNOSIS — N30.40 RADIATION CYSTITIS: ICD-10-CM

## 2021-05-28 DIAGNOSIS — R93.89 ABNORMAL FINDING ON IMAGING: ICD-10-CM

## 2021-05-28 DIAGNOSIS — N52.01 ERECTILE DYSFUNCTION DUE TO ARTERIAL INSUFFICIENCY: ICD-10-CM

## 2021-05-28 DIAGNOSIS — C34.92 SQUAMOUS CELL LUNG CANCER, LEFT (HCC): ICD-10-CM

## 2021-05-28 DIAGNOSIS — R36.1 HEMOSPERMIA: ICD-10-CM

## 2021-05-28 DIAGNOSIS — C61 PROSTATE CANCER (HCC): Primary | ICD-10-CM

## 2021-05-28 DIAGNOSIS — Z87.448 HISTORY OF GROSS HEMATURIA: ICD-10-CM

## 2021-05-28 PROCEDURE — 99214 OFFICE O/P EST MOD 30 MIN: CPT | Performed by: UROLOGY

## 2021-05-28 NOTE — PROGRESS NOTES
HPI:    Patient ID: Pauly Tolbert is a 80year old male.       HPI      Prostate cancer  Chronic.  Status post brachytherapy 2002. Patient denies associated symptoms to suggest prostate cancer such as unintentional weight loss or loss of appetite or observation.     ED  Chronic for many years. He previously took Cialis with improvement in the past, but that no longer helps. The patient states he is no longer sexually active.  He feels this condition is stable.      History of gross hematuria   Recurren 2004 it dropped to 0.7 and it was 0.5 in April 2005 and then 0.6 in October 2005. Chronic erectile dysfunction; patient was already on Viagra when first seen in December 1999, and it ultimately stopped working and that occurred before brachytherapy.    CT u obstruction; large diverticulum vs herniated bladder at posterior dome no glomerulation, no tumor; trabeculation moderate; negative cystoscopy except for bladder tic vs hernia' obtain renal US.  5/16/2019 Dr. Tianna Green (Oncology);  Squamous cell lung can eye 2014, mri showed series of strokes, no further problems and no treatment      Past Surgical History:   Procedure Laterality Date   • APPENDECTOMY     • BACK SURGERY      two microdisectomies   • CATARACT Bilateral     IOL's   • COLONOSCOPY  6/13    div 0.0 standard drinks      Comment: 6 beer per year    Drug use: No       Over the past month, how often have you had a sensation of not emptying your bladder completely after you finish urinating?: Less than 1 time in 5  Over the past month, how often have urine cytology = no cytologic evidence of malignancy  05/08/2018 PSA = <0.1;  Occult Blood = Negative; Urine Culture = No Growth;   10/12/2017 Creatinine = 0.97; eGFR = 76.05  04/20/2017 PSA = 0.1  03/28/2017 creatinine = 0.76, GFR CKD-EPI = 89.88  10/10/20 joints.       I spent 31  minutes with patient and on this case today, in reviewing chart and labs before entering the room, taking patient's  history, examining patient, reviewing past medical records, current medications, allergies, laboratories and imag The patient's lung cancer is currently in remission. Patient is followed by Dr. Lizeth Montgomery for this.     (E07.966) History of gross hematuria  Patient had a negative workup in October 2016 and March 2019 by another urologist, Dr. Marisa Keith.  Patient was questions concerning them; patient understands all of this and decides to proceed with the following:       Treatment Plan & Patient Instructions    1.   With your PSA being less than 0.1 and you not having any palpable nodules of the prostate, with an extr

## 2021-05-28 NOTE — PATIENT INSTRUCTIONS
Génesis Cruz M.D.    1.  With your PSA being less than 0.1 and you not having any palpable nodules of the prostate, with an extremely small prostate that is barely palpable, history of brachytherapy 2002, I do no

## 2021-10-04 ENCOUNTER — LAB ENCOUNTER (OUTPATIENT)
Dept: LAB | Age: 81
End: 2021-10-04
Attending: UROLOGY
Payer: MEDICARE

## 2021-10-04 DIAGNOSIS — C61 PROSTATE CANCER (HCC): ICD-10-CM

## 2021-10-04 PROCEDURE — 36415 COLL VENOUS BLD VENIPUNCTURE: CPT

## 2021-10-04 PROCEDURE — 84153 ASSAY OF PSA TOTAL: CPT

## 2021-10-18 ENCOUNTER — OFFICE VISIT (OUTPATIENT)
Dept: SURGERY | Facility: CLINIC | Age: 81
End: 2021-10-18
Payer: MEDICARE

## 2021-10-18 VITALS
SYSTOLIC BLOOD PRESSURE: 127 MMHG | DIASTOLIC BLOOD PRESSURE: 76 MMHG | WEIGHT: 249 LBS | BODY MASS INDEX: 37 KG/M2 | HEART RATE: 59 BPM

## 2021-10-18 DIAGNOSIS — C61 PROSTATE CANCER (HCC): Primary | ICD-10-CM

## 2021-10-18 DIAGNOSIS — N52.01 ERECTILE DYSFUNCTION DUE TO ARTERIAL INSUFFICIENCY: ICD-10-CM

## 2021-10-18 DIAGNOSIS — R35.1 NOCTURIA: ICD-10-CM

## 2021-10-18 DIAGNOSIS — N30.40 RADIATION CYSTITIS: ICD-10-CM

## 2021-10-18 DIAGNOSIS — K40.20 NON-RECURRENT BILATERAL INGUINAL HERNIA WITHOUT OBSTRUCTION OR GANGRENE: ICD-10-CM

## 2021-10-18 DIAGNOSIS — C34.92 SQUAMOUS CELL LUNG CANCER, LEFT (HCC): ICD-10-CM

## 2021-10-18 DIAGNOSIS — Z87.448 HISTORY OF GROSS HEMATURIA: ICD-10-CM

## 2021-10-18 DIAGNOSIS — R36.1 HEMOSPERMIA: ICD-10-CM

## 2021-10-18 DIAGNOSIS — K58.2 IRRITABLE BOWEL SYNDROME WITH BOTH CONSTIPATION AND DIARRHEA: ICD-10-CM

## 2021-10-18 PROCEDURE — 99213 OFFICE O/P EST LOW 20 MIN: CPT | Performed by: UROLOGY

## 2021-10-18 NOTE — PROGRESS NOTES
HPI:    Patient ID: Jane Calvo is a 80year old male. HPI      Prostate cancer  Chronic.  Status post brachytherapy 2002. Patient denies associated symptoms to suggest prostate cancer such as unintentional weight loss or loss of appetite or bon \" about his urinating problem.     ED  Chronic for many years. He previously took Cialis with improvement in the past, but that no longer helps. The patient states he is no longer sexually active.  He feels this condition is stable.      History of gross h August 2003 it was 2.5. Then in April 2004 it dropped to 0.3 and October 2004 it dropped to 0.7 and it was 0.5 in April 2005 and then 0.6 in October 2005.  Chronic erectile dysfunction; patient was already on Viagra when first seen in December 1999, and it crystals present; prostate/pelvic: abnormal mild inflamed mucosa, no obstruction; large diverticulum vs herniated bladder at posterior dome no glomerulation, no tumor; trabeculation moderate; negative cystoscopy except for bladder tic vs hernia' obtain derrell Vitamins-Minerals (PRESERVISION AREDS) Oral Tab Take by mouth 2 (two) times daily. • aspirin 81 MG Oral Tab Take 81 mg by mouth daily.      • GLUCOSAMINE CHONDR 500 COMPLEX OR 2 daily     • CITRUCEL OR takes daily       Allergies:No Known Allergies    H Brittany Kern MD;  Location: [address] [place] [address]   • SKIN SURGERY  09/28/2020    [facility] of scalp-Dr. Winfred Nissen    • TONSILLECTOMY        Family History   Problem Relation Age of Onset   • Cancer Father         multiple myeloma   • Glaucoma Head: Normocephalic and atraumatic. Pulmonary:      Effort: Pulmonary effort is normal. No respiratory distress. Genitourinary:     Comments:  On [facility], prostate tiny, 5 - 10 g, no palpable nodules or indurations     Musculoskeletal:         General: Norm in the prostate gland. A small focus of activity is noted in the right posterior lateral aspect of the prostate gland with maximum SUV of 3.7. This is nonspecific. Inguinal hernia which contains portion of the bladder.  IMPRESSION: No change in left lungs l syndrome with both constipation and diarrhea  Erectile dysfunction due to arterial insufficiency  Radiation cystitis  Squamous cell lung cancer, left ([facility])  History of gross hematuria  Hemospermia  Nocturia  Non-recurrent bilateral inguinal hernia without evaluation is needed.  He felt this is stable and chooses to continue observation.      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   If this is not enough to correct your problem,

## 2021-10-18 NOTE — PATIENT INSTRUCTIONS
Tl Ayers M.D.    1.   For your longstanding irritable bowel (constipation alternating with diarrhea), every morning, without exception, take 1 tablespoon of generic Metamucil, sugar-free and 10 to 12 ounces of

## 2022-04-08 ENCOUNTER — TELEPHONE (OUTPATIENT)
Dept: SURGERY | Facility: CLINIC | Age: 82
End: 2022-04-08

## 2022-04-08 DIAGNOSIS — C61 PROSTATE CANCER (HCC): Primary | ICD-10-CM

## 2022-04-08 DIAGNOSIS — R35.1 NOCTURIA: ICD-10-CM

## 2022-04-08 NOTE — TELEPHONE ENCOUNTER
Please advise    Future Appointments   Date Time Provider Department Center   4/18/2022  8:00 AM Bobby Lingo, MD [facility]   8/29/2022 10:20 AM Roderick Nicole MD [facility]         Orders pended

## 2022-04-08 NOTE — TELEPHONE ENCOUNTER
Urology staff,    I signed the orders. Please print the [id number] lab orders and mail to patient as he requested.       Thank you,     Dr. Alfonso Nichols

## 2022-04-08 NOTE — TELEPHONE ENCOUNTER
Patient has appointment on 8/29 and requesting orders for [facility] to be mailed to home address, thanks.

## 2022-08-17 LAB
APPEARANCE: CLEAR
BILIRUBIN: NEGATIVE
COLOR: YELLOW
GLUCOSE: NEGATIVE
KETONES: NEGATIVE
LEUKOCYTE ESTERASE: NEGATIVE
NITRITE: NEGATIVE
OCCULT BLOOD: NEGATIVE
PH: 6.5 (ref 5–8)
PROTEIN: NEGATIVE
PSA, TOTAL: 0.04 NG/ML
SPECIFIC GRAVITY: 1.01 (ref 1–1.03)

## 2022-08-29 ENCOUNTER — OFFICE VISIT (OUTPATIENT)
Dept: SURGERY | Facility: CLINIC | Age: 82
End: 2022-08-29
Payer: MEDICARE

## 2022-08-29 DIAGNOSIS — R36.1 HEMOSPERMIA: ICD-10-CM

## 2022-08-29 DIAGNOSIS — N30.40 RADIATION CYSTITIS: ICD-10-CM

## 2022-08-29 DIAGNOSIS — R35.1 NOCTURIA: ICD-10-CM

## 2022-08-29 DIAGNOSIS — R33.9 INCOMPLETE BLADDER EMPTYING: ICD-10-CM

## 2022-08-29 DIAGNOSIS — C34.92 SQUAMOUS CELL LUNG CANCER, LEFT (HCC): ICD-10-CM

## 2022-08-29 DIAGNOSIS — Z87.448 HISTORY OF GROSS HEMATURIA: ICD-10-CM

## 2022-08-29 DIAGNOSIS — N52.01 ERECTILE DYSFUNCTION DUE TO ARTERIAL INSUFFICIENCY: ICD-10-CM

## 2022-08-29 DIAGNOSIS — K40.20 NON-RECURRENT BILATERAL INGUINAL HERNIA WITHOUT OBSTRUCTION OR GANGRENE: ICD-10-CM

## 2022-08-29 DIAGNOSIS — K58.2 IRRITABLE BOWEL SYNDROME WITH BOTH CONSTIPATION AND DIARRHEA: ICD-10-CM

## 2022-08-29 DIAGNOSIS — R97.21 RISING PSA FOLLOWING TREATMENT FOR MALIGNANT NEOPLASM OF PROSTATE: ICD-10-CM

## 2022-08-29 DIAGNOSIS — C61 PROSTATE CANCER (HCC): Primary | ICD-10-CM

## 2022-08-29 PROCEDURE — 99214 OFFICE O/P EST MOD 30 MIN: CPT | Performed by: UROLOGY

## (undated) NOTE — LETTER
[address], [place]  Authorization for Invasive Procedures  1.  I hereby authorize  Dr. Bryant Rivera , my physician and whomever may be designated as the doctor's assistant, to perform the following operation and/or procedure: Ultrasound performed for the purposes of advancing medicine, science, and/or education, provided my identity is not revealed. If the procedure has been videotaped, the physician/surgeon will obtain the original videotape.  The hospital will not be responsible for stor My signature below affirms that prior to the time of the procedure, I have explained to the patient and/or his legal representative, the risks and benefits involved in the proposed treatment and any reasonable alternative to the proposed treatment.  I have

## (undated) NOTE — LETTER
[address] [place] [address]  Authorization for Invasive Procedures  1.  I hereby authorize Dr. Rod Holland , my physician and whomever may be designated as the doctor's assistant, to perform the following operation and/or procedure:  COMPUTERIZE 5. I consent to the photographing of the operations or procedures to be performed for the purposes of advancing medicine, science, and/or education, provided my identity is not revealed.  If the procedure has been videotaped, the physician/surgeon will obta __________ Time: ___________    Statement of Physician  My signature below affirms that prior to the time of the procedure, I have explained to the patient and/or his legal representative, the risks and benefits involved in the proposed treatment and any r

## (undated) NOTE — MR AVS SNAPSHOT
[place]  [address]  [phone]               Thank you for choosing us for your health care visit with Tresa Barahona MD.  We are glad to serve you and happy to provide you with this summ 118/80 mmHg 61 97.6 °F (36.4 °C) (Oral) 5' 9.5\" (1.765 m) 235 lb (106.595 kg) 34.22 kg/m2         Current Medications          This list is accurate as of: 5/10/17  8:29 AM.  Always use your most recent med list.                allopurinol 300 MG Tabs